# Patient Record
Sex: FEMALE | Race: BLACK OR AFRICAN AMERICAN | NOT HISPANIC OR LATINO | Employment: PART TIME | URBAN - METROPOLITAN AREA
[De-identification: names, ages, dates, MRNs, and addresses within clinical notes are randomized per-mention and may not be internally consistent; named-entity substitution may affect disease eponyms.]

---

## 2017-03-12 ENCOUNTER — HOSPITAL ENCOUNTER (EMERGENCY)
Facility: HOSPITAL | Age: 22
Discharge: HOME | End: 2017-03-12
Attending: EMERGENCY MEDICINE

## 2017-03-12 VITALS
HEART RATE: 98 BPM | TEMPERATURE: 98.6 F | DIASTOLIC BLOOD PRESSURE: 63 MMHG | WEIGHT: 205.03 LBS | SYSTOLIC BLOOD PRESSURE: 108 MMHG | OXYGEN SATURATION: 99 % | RESPIRATION RATE: 16 BRPM

## 2017-03-12 DIAGNOSIS — J02.9 PHARYNGITIS: Primary | ICD-10-CM

## 2017-03-12 DIAGNOSIS — R31.29 MICROSCOPIC HEMATURIA: ICD-10-CM

## 2017-03-12 DIAGNOSIS — M79.10 MYALGIA: ICD-10-CM

## 2017-03-12 LAB
BACTERIA UR QL AUTO: ABNORMAL /HPF
BILIRUB UR QL STRIP: NEGATIVE
CLARITY UR: CLEAR
COLOR UR: YELLOW
COLOR, POC: YELLOW
GLUCOSE UR STRIP-MCNC: NEGATIVE MG/DL
HCG UR QL: NEGATIVE
HGB UR QL STRIP.AUTO: ABNORMAL
KETONES UR STRIP-MCNC: NEGATIVE MG/DL
LEUKOCYTE ESTERASE UR QL STRIP: NEGATIVE
NITRITE UR QL STRIP: NEGATIVE
NON-SQ EPI CELLS URNS QL MICRO: ABNORMAL /HPF
PH UR STRIP.AUTO: 5.5 [PH] (ref 4.5–8)
PROT UR STRIP-MCNC: NEGATIVE MG/DL
RBC #/AREA URNS AUTO: ABNORMAL /HPF
SP GR UR STRIP.AUTO: <=1.005 (ref 1–1.03)
UROBILINOGEN UR QL STRIP.AUTO: 0.2 E.U./DL
WBC #/AREA URNS AUTO: ABNORMAL /HPF

## 2017-03-12 PROCEDURE — 81002 URINALYSIS NONAUTO W/O SCOPE: CPT | Performed by: EMERGENCY MEDICINE

## 2017-03-12 PROCEDURE — 81001 URINALYSIS AUTO W/SCOPE: CPT

## 2017-03-12 PROCEDURE — 87086 URINE CULTURE/COLONY COUNT: CPT

## 2017-03-12 PROCEDURE — 81025 URINE PREGNANCY TEST: CPT | Performed by: EMERGENCY MEDICINE

## 2017-03-12 PROCEDURE — 87389 HIV-1 AG W/HIV-1&-2 AB AG IA: CPT | Performed by: EMERGENCY MEDICINE

## 2017-03-12 PROCEDURE — 36415 COLL VENOUS BLD VENIPUNCTURE: CPT | Performed by: EMERGENCY MEDICINE

## 2017-03-12 PROCEDURE — 87591 N.GONORRHOEAE DNA AMP PROB: CPT | Performed by: EMERGENCY MEDICINE

## 2017-03-12 PROCEDURE — 87491 CHLMYD TRACH DNA AMP PROBE: CPT | Performed by: EMERGENCY MEDICINE

## 2017-03-12 PROCEDURE — 99283 EMERGENCY DEPT VISIT LOW MDM: CPT

## 2017-03-12 RX ORDER — LIDOCAINE 50 MG/G
1 PATCH TOPICAL ONCE
Status: DISCONTINUED | OUTPATIENT
Start: 2017-03-12 | End: 2017-03-12 | Stop reason: HOSPADM

## 2017-03-12 RX ORDER — ACETAMINOPHEN 325 MG/1
650 TABLET ORAL ONCE
Status: COMPLETED | OUTPATIENT
Start: 2017-03-12 | End: 2017-03-12

## 2017-03-12 RX ORDER — AMOXICILLIN 500 MG/1
500 CAPSULE ORAL 2 TIMES DAILY
Qty: 20 CAPSULE | Refills: 0 | Status: SHIPPED | OUTPATIENT
Start: 2017-03-12 | End: 2017-03-22

## 2017-03-12 RX ADMIN — ACETAMINOPHEN 650 MG: 325 TABLET, FILM COATED ORAL at 14:50

## 2017-03-12 RX ADMIN — DEXAMETHASONE SODIUM PHOSPHATE 10 MG: 10 INJECTION INTRAMUSCULAR; INTRAVENOUS at 14:51

## 2017-03-12 RX ADMIN — LIDOCAINE 1 PATCH: 50 PATCH CUTANEOUS at 15:33

## 2017-03-13 LAB
BACTERIA UR CULT: NORMAL
CHLAMYDIA DNA CVX QL NAA+PROBE: NORMAL
HIV 1+2 AB+HIV1 P24 AG SERPL QL IA: NORMAL
N GONORRHOEA DNA GENITAL QL NAA+PROBE: NORMAL

## 2024-07-11 ENCOUNTER — TELEPHONE (OUTPATIENT)
Age: 29
End: 2024-07-11

## 2024-07-11 NOTE — TELEPHONE ENCOUNTER
New patient called, EARLENE delivered  in [place] & now moved to . Pt requests postpartum visit & bp check. Clarified pt has an appt with [facility] ; pt states [facility] does not accept [place] [employer] & requests scheduling with [facility]. Pt states [place] [employer] insurance lapsed but  advised it will reinstate  or 7/15.   Warm transferred to [facility] office for assistance with scheduling.

## 2024-08-13 ENCOUNTER — OFFICE VISIT (OUTPATIENT)
Dept: OBGYN CLINIC | Facility: CLINIC | Age: 29
End: 2024-08-13
Payer: COMMERCIAL

## 2024-08-13 VITALS
HEIGHT: 60 IN | SYSTOLIC BLOOD PRESSURE: 138 MMHG | WEIGHT: 164 LBS | BODY MASS INDEX: 32.2 KG/M2 | DIASTOLIC BLOOD PRESSURE: 84 MMHG

## 2024-08-13 DIAGNOSIS — R73.09 ELEVATED HEMOGLOBIN A1C: ICD-10-CM

## 2024-08-13 DIAGNOSIS — Z97.5 NEXPLANON IN PLACE: ICD-10-CM

## 2024-08-13 DIAGNOSIS — O41.1230 CHORIOAMNIONITIS IN THIRD TRIMESTER, SINGLE OR UNSPECIFIED FETUS: ICD-10-CM

## 2024-08-13 DIAGNOSIS — O13.3 GESTATIONAL HYPERTENSION, THIRD TRIMESTER: ICD-10-CM

## 2024-08-13 DIAGNOSIS — Z65.9 OTHER SOCIAL STRESSOR: ICD-10-CM

## 2024-08-13 DIAGNOSIS — D50.8 IRON DEFICIENCY ANEMIA SECONDARY TO INADEQUATE DIETARY IRON INTAKE: ICD-10-CM

## 2024-08-13 DIAGNOSIS — O99.340 DEPRESSION DURING PREGNANCY, ANTEPARTUM: ICD-10-CM

## 2024-08-13 DIAGNOSIS — F32.A DEPRESSION DURING PREGNANCY, ANTEPARTUM: ICD-10-CM

## 2024-08-13 DIAGNOSIS — O03.4 RETAINED PRODUCTS OF CONCEPTION FOLLOWING ABORTION: ICD-10-CM

## 2024-08-13 PROBLEM — O41.1290 CHORIOAMNIONITIS: Status: ACTIVE | Noted: 2024-07-06

## 2024-08-13 PROBLEM — O13.9 GESTATIONAL HYPERTENSION: Status: ACTIVE | Noted: 2024-07-06

## 2024-08-13 NOTE — PROGRESS NOTES
Subjective    Patient is a 30 yo  who presents today for routine postpartum care s/p  at [facility] on 24. She was induced in the setting of [facility]. Her labor course was complicated by chorioamnionitis and gestational hypertension. She did not sustain any lacerations during delivery. She received a Nexplanon prior to discharge for contraception. She reports light bleeding that re-started recently, but otherwise feels ok. She does note that she has been more emotional lately and crying a lot, which she attributes to psychosocial stressors in her life. She reports that FOB is not involved, but she moved to the area recently to live with her mom, who is supportive.    OB History          3    Para   1    Term   1            AB   2    Living   1         SAB        IAB   2    Ectopic        Multiple        Live Births   1                        Objective    Vitals:    24 1109   BP: 138/84   BP Location: Left arm   Patient Position: Sitting   Cuff Size: Standard   Weight: 74.4 kg (164 lb)   Height: 5' (1.524 m)        Physical Exam  Constitutional:       Appearance: Normal appearance.   HENT:      Head: Normocephalic and atraumatic.   Cardiovascular:      Rate and Rhythm: Normal rate.   Pulmonary:      Effort: Pulmonary effort is normal. No respiratory distress.   Abdominal:      Palpations: Abdomen is soft.      Tenderness: There is no abdominal tenderness.   Musculoskeletal:         General: Normal range of motion.   Neurological:      Mental Status: She is alert.   Skin:     General: Skin is warm and dry.          Assessment    Patient Active Problem List   Diagnosis    Chorioamnionitis    Depression during pregnancy, antepartum    Elevated hemoglobin A1c    Gestational hypertension    Iron deficiency anemia secondary to inadequate dietary iron intake    Nexplanon in place    Other social stressor    Retained products of conception following         Plan  - Discussed re-starting Zoloft  due to her depressive symptoms; patient declines at this time, but will call if she is interested  - Patient reports continued carpal tunnel symptoms in her left wrist, which she had during pregnancy; discussed seeing PCP  - Return for annual exam

## 2024-10-07 ENCOUNTER — TELEPHONE (OUTPATIENT)
Dept: FAMILY MEDICINE CLINIC | Facility: CLINIC | Age: 29
End: 2024-10-07

## 2024-10-07 NOTE — TELEPHONE ENCOUNTER
Pt had 10/7 appt and was canceled due to provider not in office/ left detailed message and to call us back with rescheduling. If pt calls back pr comes to office, please assist with rescheduling.

## 2024-10-24 ENCOUNTER — HOSPITAL ENCOUNTER (OUTPATIENT)
Dept: RADIOLOGY | Facility: HOSPITAL | Age: 29
End: 2024-10-24
Payer: COMMERCIAL

## 2024-10-24 ENCOUNTER — APPOINTMENT (OUTPATIENT)
Dept: LAB | Facility: CLINIC | Age: 29
End: 2024-10-24
Payer: COMMERCIAL

## 2024-10-24 ENCOUNTER — OFFICE VISIT (OUTPATIENT)
Dept: FAMILY MEDICINE CLINIC | Facility: CLINIC | Age: 29
End: 2024-10-24

## 2024-10-24 VITALS
TEMPERATURE: 97.6 F | SYSTOLIC BLOOD PRESSURE: 116 MMHG | HEIGHT: 60 IN | DIASTOLIC BLOOD PRESSURE: 62 MMHG | BODY MASS INDEX: 33.38 KG/M2 | RESPIRATION RATE: 17 BRPM | HEART RATE: 88 BPM | OXYGEN SATURATION: 98 % | WEIGHT: 170 LBS

## 2024-10-24 DIAGNOSIS — Z11.59 NEED FOR HEPATITIS C SCREENING TEST: ICD-10-CM

## 2024-10-24 DIAGNOSIS — M25.532 WRIST PAIN, CHRONIC, LEFT: ICD-10-CM

## 2024-10-24 DIAGNOSIS — Z78.9 EXCLUSIVE BREASTFEEDING BY MOTHER: ICD-10-CM

## 2024-10-24 DIAGNOSIS — I49.3 FREQUENT PVCS: ICD-10-CM

## 2024-10-24 DIAGNOSIS — D50.8 IRON DEFICIENCY ANEMIA SECONDARY TO INADEQUATE DIETARY IRON INTAKE: ICD-10-CM

## 2024-10-24 DIAGNOSIS — R73.09 ELEVATED HEMOGLOBIN A1C: ICD-10-CM

## 2024-10-24 DIAGNOSIS — G89.29 WRIST PAIN, CHRONIC, LEFT: ICD-10-CM

## 2024-10-24 DIAGNOSIS — Z00.00 ENCOUNTER FOR MEDICAL EXAMINATION TO ESTABLISH CARE: ICD-10-CM

## 2024-10-24 DIAGNOSIS — R73.09 ELEVATED HEMOGLOBIN A1C: Primary | ICD-10-CM

## 2024-10-24 DIAGNOSIS — I49.9 IRREGULAR HEART RHYTHM: ICD-10-CM

## 2024-10-24 PROBLEM — O03.4 RETAINED PRODUCTS OF CONCEPTION FOLLOWING ABORTION: Status: RESOLVED | Noted: 2018-11-01 | Resolved: 2024-10-24

## 2024-10-24 PROBLEM — Z65.9 OTHER SOCIAL STRESSOR: Status: RESOLVED | Noted: 2024-01-03 | Resolved: 2024-10-24

## 2024-10-24 PROBLEM — O13.9 GESTATIONAL HYPERTENSION: Status: RESOLVED | Noted: 2024-07-06 | Resolved: 2024-10-24

## 2024-10-24 PROBLEM — O41.1290 CHORIOAMNIONITIS: Status: RESOLVED | Noted: 2024-07-06 | Resolved: 2024-10-24

## 2024-10-24 LAB
BASOPHILS # BLD AUTO: 0.04 THOUSANDS/ΜL (ref 0–0.1)
BASOPHILS NFR BLD AUTO: 1 % (ref 0–1)
EOSINOPHIL # BLD AUTO: 0.07 THOUSAND/ΜL (ref 0–0.61)
EOSINOPHIL NFR BLD AUTO: 1 % (ref 0–6)
ERYTHROCYTE [DISTWIDTH] IN BLOOD BY AUTOMATED COUNT: 12 % (ref 11.6–15.1)
EST. AVERAGE GLUCOSE BLD GHB EST-MCNC: 111 MG/DL
FERRITIN SERPL-MCNC: 54 NG/ML (ref 11–307)
HBA1C MFR BLD: 5.5 %
HCT VFR BLD AUTO: 48 % (ref 34.8–46.1)
HCV AB SER QL: NORMAL
HGB BLD-MCNC: 14.9 G/DL (ref 11.5–15.4)
IMM GRANULOCYTES # BLD AUTO: 0.02 THOUSAND/UL (ref 0–0.2)
IMM GRANULOCYTES NFR BLD AUTO: 0 % (ref 0–2)
IRON SATN MFR SERPL: 24 % (ref 15–50)
IRON SERPL-MCNC: 88 UG/DL (ref 50–212)
LYMPHOCYTES # BLD AUTO: 2.64 THOUSANDS/ΜL (ref 0.6–4.47)
LYMPHOCYTES NFR BLD AUTO: 41 % (ref 14–44)
MCH RBC QN AUTO: 30.6 PG (ref 26.8–34.3)
MCHC RBC AUTO-ENTMCNC: 31 G/DL (ref 31.4–37.4)
MCV RBC AUTO: 99 FL (ref 82–98)
MONOCYTES # BLD AUTO: 0.51 THOUSAND/ΜL (ref 0.17–1.22)
MONOCYTES NFR BLD AUTO: 8 % (ref 4–12)
NEUTROPHILS # BLD AUTO: 3.12 THOUSANDS/ΜL (ref 1.85–7.62)
NEUTS SEG NFR BLD AUTO: 49 % (ref 43–75)
NRBC BLD AUTO-RTO: 0 /100 WBCS
PLATELET # BLD AUTO: 271 THOUSANDS/UL (ref 149–390)
PMV BLD AUTO: 9.4 FL (ref 8.9–12.7)
RBC # BLD AUTO: 4.87 MILLION/UL (ref 3.81–5.12)
TIBC SERPL-MCNC: 371 UG/DL (ref 250–450)
UIBC SERPL-MCNC: 283 UG/DL (ref 155–355)
WBC # BLD AUTO: 6.4 THOUSAND/UL (ref 4.31–10.16)

## 2024-10-24 PROCEDURE — 82728 ASSAY OF FERRITIN: CPT

## 2024-10-24 PROCEDURE — 36415 COLL VENOUS BLD VENIPUNCTURE: CPT

## 2024-10-24 PROCEDURE — 83550 IRON BINDING TEST: CPT

## 2024-10-24 PROCEDURE — 86803 HEPATITIS C AB TEST: CPT

## 2024-10-24 PROCEDURE — 83036 HEMOGLOBIN GLYCOSYLATED A1C: CPT

## 2024-10-24 PROCEDURE — 99204 OFFICE O/P NEW MOD 45 MIN: CPT | Performed by: FAMILY MEDICINE

## 2024-10-24 PROCEDURE — 73110 X-RAY EXAM OF WRIST: CPT

## 2024-10-24 PROCEDURE — 83540 ASSAY OF IRON: CPT

## 2024-10-24 PROCEDURE — 85025 COMPLETE CBC W/AUTO DIFF WBC: CPT

## 2024-10-24 RX ORDER — DOCUSATE SODIUM 100 MG/1
100 CAPSULE, LIQUID FILLED ORAL 2 TIMES DAILY
COMMUNITY

## 2024-10-24 RX ORDER — PRENATAL WITH FERROUS FUM AND FOLIC ACID 3080; 920; 120; 400; 22; 1.84; 3; 20; 10; 1; 12; 200; 27; 25; 2 [IU]/1; [IU]/1; MG/1; [IU]/1; MG/1; MG/1; MG/1; MG/1; MG/1; MG/1; UG/1; MG/1; MG/1; MG/1; MG/1
1 TABLET ORAL DAILY
COMMUNITY
Start: 2024-08-16
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
## 2024-11-04 PROCEDURE — 93000 ELECTROCARDIOGRAM COMPLETE: CPT | Performed by: FAMILY MEDICINE

## 2024-11-15 ENCOUNTER — APPOINTMENT (OUTPATIENT)
Dept: LAB | Facility: CLINIC | Age: 29
End: 2024-11-15
Payer: COMMERCIAL

## 2024-11-15 ENCOUNTER — OFFICE VISIT (OUTPATIENT)
Dept: FAMILY MEDICINE CLINIC | Facility: CLINIC | Age: 29
End: 2024-11-15

## 2024-11-15 VITALS
HEART RATE: 63 BPM | RESPIRATION RATE: 18 BRPM | SYSTOLIC BLOOD PRESSURE: 110 MMHG | DIASTOLIC BLOOD PRESSURE: 62 MMHG | OXYGEN SATURATION: 99 % | HEIGHT: 60 IN | TEMPERATURE: 97.7 F | BODY MASS INDEX: 34.63 KG/M2 | WEIGHT: 176.4 LBS

## 2024-11-15 DIAGNOSIS — G89.29 WRIST PAIN, CHRONIC, LEFT: Primary | ICD-10-CM

## 2024-11-15 DIAGNOSIS — L85.3 XEROSIS CUTIS: ICD-10-CM

## 2024-11-15 DIAGNOSIS — Z23 ENCOUNTER FOR IMMUNIZATION: ICD-10-CM

## 2024-11-15 DIAGNOSIS — Z78.9 BREASTFEEDING (INFANT): ICD-10-CM

## 2024-11-15 DIAGNOSIS — I49.9 IRREGULAR HEART RHYTHM: ICD-10-CM

## 2024-11-15 DIAGNOSIS — I49.3 FREQUENT PVCS: ICD-10-CM

## 2024-11-15 DIAGNOSIS — H93.8X1 MASS OF RIGHT EAR: ICD-10-CM

## 2024-11-15 DIAGNOSIS — M25.532 WRIST PAIN, CHRONIC, LEFT: Primary | ICD-10-CM

## 2024-11-15 LAB
ALBUMIN SERPL BCG-MCNC: 4.1 G/DL (ref 3.5–5)
ALP SERPL-CCNC: 77 U/L (ref 34–104)
ALT SERPL W P-5'-P-CCNC: 17 U/L (ref 7–52)
ANION GAP SERPL CALCULATED.3IONS-SCNC: 2 MMOL/L (ref 4–13)
AST SERPL W P-5'-P-CCNC: 17 U/L (ref 13–39)
BILIRUB SERPL-MCNC: 0.33 MG/DL (ref 0.2–1)
BUN SERPL-MCNC: 14 MG/DL (ref 5–25)
CALCIUM SERPL-MCNC: 9.5 MG/DL (ref 8.4–10.2)
CHLORIDE SERPL-SCNC: 103 MMOL/L (ref 96–108)
CO2 SERPL-SCNC: 32 MMOL/L (ref 21–32)
CREAT SERPL-MCNC: 0.89 MG/DL (ref 0.6–1.3)
GFR SERPL CREATININE-BSD FRML MDRD: 89 ML/MIN/1.73SQ M
GLUCOSE P FAST SERPL-MCNC: 84 MG/DL (ref 65–99)
POTASSIUM SERPL-SCNC: 4.7 MMOL/L (ref 3.5–5.3)
PROT SERPL-MCNC: 7.4 G/DL (ref 6.4–8.4)
SODIUM SERPL-SCNC: 137 MMOL/L (ref 135–147)
T4 FREE SERPL-MCNC: 0.99 NG/DL (ref 0.61–1.12)
TSH SERPL DL<=0.05 MIU/L-ACNC: 1.19 UIU/ML (ref 0.45–4.5)

## 2024-11-15 PROCEDURE — 80053 COMPREHEN METABOLIC PANEL: CPT

## 2024-11-15 PROCEDURE — 84439 ASSAY OF FREE THYROXINE: CPT

## 2024-11-15 PROCEDURE — 90471 IMMUNIZATION ADMIN: CPT | Performed by: FAMILY MEDICINE

## 2024-11-15 PROCEDURE — 99214 OFFICE O/P EST MOD 30 MIN: CPT | Performed by: FAMILY MEDICINE

## 2024-11-15 PROCEDURE — 36415 COLL VENOUS BLD VENIPUNCTURE: CPT

## 2024-11-15 PROCEDURE — 84443 ASSAY THYROID STIM HORMONE: CPT

## 2024-11-15 PROCEDURE — 90656 IIV3 VACC NO PRSV 0.5 ML IM: CPT | Performed by: FAMILY MEDICINE

## 2024-11-15 RX ORDER — AMMONIUM LACTATE 12 G/100G
CREAM TOPICAL AS NEEDED
Qty: 385 G | Refills: 0 | Status: SHIPPED | OUTPATIENT
Start: 2024-11-15

## 2024-11-15 NOTE — PROGRESS NOTES
Name: Kenna Vera      : 1995      MRN: [id number]  Encounter Provider: Shandra Carnes MD  Encounter Date: 11/15/2024   Encounter department: [facility]  :  Assessment & Plan  Wrist pain, chronic, left  Left wrist x-ray showed no abnormalities no abnormalities  Okay ice,  compression, stretches  Wrist brace ordered  Will refer to orthopedics  Orders:    Ambulatory Referral to Orthopedic Surgery; Future    Elastic Bandages & Supports (Adjustable Wrist Brace) MISC; Use daily as needed (left wrist pain)    Mass of right ear  cystic mobile mass of right lower lobe   Will order ultrasound  Orders:    US head neck soft tissue; Future    Xerosis cutis  Apply to feet twice daily as needed  Orders:    ammonium lactate (LAC-HYDRIN) 12 % cream; Apply topically as needed for dry skin    Breastfeeding (infant)    Orders:    Breast pump    Encounter for immunization    Orders:    influenza vaccine preservative-free 0.5 mL IM (Fluzone, Afluria, Fluarix, Flulaval)           History of Present Illness     HPI  Kenna Vera is a 29 y.o. female  who presented to the office today to follow-up for chronic conditions.    Pt states that the left wrist pain has not improved.  The pain is a 9/10 mostly while sleeping at night, and then increases when she tries to move it.  Has a 4 month old baby whom she is still breastfeeding,    Pt is right handed.  No numbness or tingling in the left hand.  The left hand feels like it is about to lock when she stretches it.     Patient is exclusively breast-feeding her 4-month-old daughter, requesting breast pump      The following portions of the patient's history were reviewed and updated as appropriate: allergies, current medications, past family history, past medical history, past social history, past surgical history and problem list.    Review of Systems   Constitutional:  Negative for chills and fever.   HENT:  Negative for congestion,  rhinorrhea and sore throat.    Respiratory:  Negative for cough and shortness of breath.    Cardiovascular:  Positive for palpitations. Negative for chest pain.   Gastrointestinal:  Positive for constipation. Negative for diarrhea, nausea and vomiting.   Genitourinary:  Negative for vaginal discharge.   Skin:  Negative for rash.   Neurological:  Negative for dizziness and headaches.          Objective   /62 (BP Location: Right arm, Patient Position: Sitting, Cuff Size: Standard)   Pulse 63   Temp 97.7 °F (36.5 °C) (Temporal)   Resp 18   Ht 5' (1.524 m)   Wt 80 kg (176 lb 6.4 oz)   SpO2 99%   BMI 34.45 kg/m²      Physical Exam  Vitals and nursing note reviewed.   Constitutional:       Appearance: Normal appearance.   HENT:      Head: Normocephalic and atraumatic.      Mouth/Throat:      Mouth: Mucous membranes are moist.   Cardiovascular:      Rate and Rhythm: Normal rate.   Pulmonary:      Effort: Pulmonary effort is normal.   Musculoskeletal:      Left wrist: Swelling, tenderness and bony tenderness present. Decreased range of motion. Normal pulse.   Neurological:      Mental Status: She is alert and oriented to person, place, and time.   Psychiatric:         Mood and Affect: Mood normal.         Behavior: Behavior normal.

## 2024-11-20 ENCOUNTER — OFFICE VISIT (OUTPATIENT)
Dept: OBGYN CLINIC | Facility: CLINIC | Age: 29
End: 2024-11-20
Payer: COMMERCIAL

## 2024-11-20 VITALS — BODY MASS INDEX: 34.55 KG/M2 | WEIGHT: 176 LBS | HEIGHT: 60 IN

## 2024-11-20 DIAGNOSIS — M25.532 PAIN IN LEFT WRIST: ICD-10-CM

## 2024-11-20 DIAGNOSIS — M65.4 TENDINITIS, DE QUERVAIN'S: Primary | ICD-10-CM

## 2024-11-20 PROCEDURE — 99203 OFFICE O/P NEW LOW 30 MIN: CPT | Performed by: SURGERY

## 2024-11-20 NOTE — PROGRESS NOTES
ORTHOPAEDIC HAND, WRIST, AND ELBOW OFFICE  VISIT       ASSESSMENT/PLAN:      29 y.o. year old female who presents with left DeQuervain's Tendonitis    Physical exam was performed and the findings are consistent with DeQuervain's Tendonitis  Injections were offered for tendonits and we discussed they are 75-80% effective at resolving symptoms. They may have a repeat injection in 3 months if they get good relief of symptoms or full resolution that returns. We also discuss she may start night time splinting  Pt would like to start with splinting. Thumb spica dispensed for night time use.   NSAID's as needed for pain  Activities as tolerated and increase as able.  Work note provided  She may return for possible injection if she fails to get relief with splinting        The patient verbalized understanding of exam findings and treatment plan. We engaged in the shared decision-making process and treatment options were discussed at length with the patient. Surgical and conservative management discussed today along with risks and benefits.    Diagnoses and all orders for this visit:    Tendinitis, de Quervain's  -     Durable Medical Equipment    Pain in left wrist  -     Ambulatory Referral to Orthopedic Surgery        Follow Up:  Return in about 4 weeks (around 12/18/2024) for Recheck.    To Do Next Visit:  Re-evaluation of current issue Can have injection      ____________________________________________________________________________________________________________________________________________      CHIEF COMPLAINT:  Chief Complaint   Patient presents with    Left Wrist - Pain     Poss cyst and cts        SUBJECTIVE:  Kenna Vera is a 29 y.o. year old  female who presents for evaluation of Left wrist    Patient reports she ha had left radial wrist pain for several months. Pain started about after her daughter was born this past summer. She denies any injuries.  She did have numbness and tingling when she was  pregnant but that has since resolved. Pain is note with holding her child.  She has been using Voltaren gel and a brace that was ordered 5 days ago by her PCP. She states neither have provided any relief    I have personally reviewed all the relevant PMH, PSH, SH, FH, Medications and allergies      PAST MEDICAL HISTORY:  Past Medical History:   Diagnosis Date    Anemia     Anxiety     Chronic pain of left wrist     Gestational hypertension 07/06/2024    Received 5 day Lasix course at [facility]  Treated for 1 severe range BP during labor, not discharged on any medications         PAST SURGICAL HISTORY:  Past Surgical History:   Procedure Laterality Date    NO PAST SURGERIES         FAMILY HISTORY:  Family History   Problem Relation Age of Onset    Diabetes Father     Asthma Brother        SOCIAL HISTORY:  Social History     Tobacco Use    Smoking status: Former   Vaping Use    Vaping status: Never Used   Substance Use Topics    Alcohol use: Never    Drug use: Never       MEDICATIONS:    Current Outpatient Medications:     ammonium lactate (LAC-HYDRIN) 12 % cream, Apply topically as needed for dry skin, Disp: 385 g, Rfl: 0    Diclofenac Sodium (VOLTAREN) 1 %, Apply 2 g topically 4 (four) times a day, Disp: 150 g, Rfl: 0    docusate sodium (COLACE) 100 mg capsule, Take 100 mg by mouth 2 (two) times a day, Disp: , Rfl:     Elastic Bandages & Supports (Adjustable Wrist Brace) MISC, Use daily as needed (left wrist pain), Disp: 1 each, Rfl: 0    Prenatal 27-1 MG TABS, Take 1 tablet by mouth daily, Disp: , Rfl:     ALLERGIES:  No Known Allergies        REVIEW OF SYSTEMS:  Review of Systems   Constitutional:  Negative for chills and fever.   HENT:  Negative for ear pain and sore throat.    Eyes:  Negative for pain and visual disturbance.   Respiratory:  Negative for cough and shortness of breath.    Cardiovascular:  Negative for chest pain and palpitations.   Gastrointestinal:  Negative for abdominal pain and vomiting.    Genitourinary:  Negative for dysuria and hematuria.   Musculoskeletal:  Negative for arthralgias and back pain.   Skin:  Negative for color change and rash.   Neurological:  Negative for seizures and syncope.   All other systems reviewed and are negative.      VITALS:  There were no vitals filed for this visit.    LABS:  HgA1c:   Lab Results   Component Value Date    HGBA1C 5.5 10/24/2024     BMP:   Lab Results   Component Value Date    CALCIUM 9.5 11/15/2024    K 4.7 11/15/2024    CO2 32 11/15/2024     11/15/2024    BUN 14 11/15/2024    CREATININE 0.89 11/15/2024       _____________________________________________________  PHYSICAL EXAMINATION:  General: well developed and well nourished, alert, oriented times 3, and appears comfortable  Psychiatric: Normal  HEENT: Normocephalic, Atraumatic Trachea Midline, No torticollis  Pulmonary: No audible wheezing or respiratory distress   Abdomen/GI: Non tender, non distended   Cardiovascular: No pitting edema, 2+ radial pulse   Skin: No masses, erythema, lacerations, fluctation, ulcerations  Neurovascular: Sensation Intact to the Median, Ulnar, Radial Nerve, Motor Intact to the Median, Ulnar, Radial Nerve, and Pulses Intact  Musculoskeletal: Normal, except as noted in detailed exam and in HPI.      MUSCULOSKELETAL EXAMINATION:  Right hand:  SILT  Composite fist    Left hand:  SILT  Composite fist    De Quervain's Tenosynovitis Exam:  left side  Positive tender to palpation over 1st dorsal extensor compartment   Positive Finkelstein's test        ___________________________________________________  STUDIES REVIEWED:  No new images obtained/reviewed        PROCEDURES PERFORMED:  Procedures  No Procedures performed today    _____________________________________________________      Scribe Attestation      I,:  Sudhir Pineda am acting as a scribe while in the presence of the attending physician.:       I,:  Aaron Galvin MD personally performed the services described in  this documentation    as scribed in my presence.:

## 2024-11-20 NOTE — LETTER
November 20, 2024     Patient: Kenna Vera  YOB: 1995  Date of Visit: 11/20/2024      To Whom it May Concern:    Kenna Vera is under my professional care. Kenna was seen in my office on 11/20/2024. Kenna was seen for her left wrist tendinitis. She will be out of work until her follow up visit with updates provided at that time    If you have any questions or concerns, please don't hesitate to call.         Sincerely,          Aaron Galvin MD

## 2024-12-02 ENCOUNTER — RESULTS FOLLOW-UP (OUTPATIENT)
Dept: FAMILY MEDICINE CLINIC | Facility: CLINIC | Age: 29
End: 2024-12-02

## 2024-12-02 ENCOUNTER — HOSPITAL ENCOUNTER (OUTPATIENT)
Dept: ULTRASOUND IMAGING | Facility: HOSPITAL | Age: 29
Discharge: HOME | End: 2024-12-02
Attending: FAMILY MEDICINE
Payer: COMMERCIAL

## 2024-12-02 DIAGNOSIS — H93.8X1 MASS OF RIGHT EAR: ICD-10-CM

## 2024-12-02 PROCEDURE — 76536 US EXAM OF HEAD AND NECK: CPT

## 2024-12-18 ENCOUNTER — OFFICE VISIT (OUTPATIENT)
Dept: OBGYN CLINIC | Facility: CLINIC | Age: 29
End: 2024-12-18
Payer: COMMERCIAL

## 2024-12-18 VITALS — HEIGHT: 60 IN | BODY MASS INDEX: 34.55 KG/M2 | WEIGHT: 176 LBS

## 2024-12-18 DIAGNOSIS — M65.4 TENDINITIS, DE QUERVAIN'S: Primary | ICD-10-CM

## 2024-12-18 PROCEDURE — 20550 NJX 1 TENDON SHEATH/LIGAMENT: CPT | Performed by: PHYSICIAN ASSISTANT

## 2024-12-18 RX ORDER — LIDOCAINE HYDROCHLORIDE 10 MG/ML
3 INJECTION, SOLUTION INFILTRATION; PERINEURAL
Status: COMPLETED | OUTPATIENT
Start: 2024-12-18 | End: 2024-12-18

## 2024-12-18 RX ORDER — BETAMETHASONE SODIUM PHOSPHATE AND BETAMETHASONE ACETATE 3; 3 MG/ML; MG/ML
6 INJECTION, SUSPENSION INTRA-ARTICULAR; INTRALESIONAL; INTRAMUSCULAR; SOFT TISSUE
Status: COMPLETED | OUTPATIENT
Start: 2024-12-18 | End: 2024-12-18

## 2024-12-18 RX ADMIN — BETAMETHASONE SODIUM PHOSPHATE AND BETAMETHASONE ACETATE 6 MG: 3; 3 INJECTION, SUSPENSION INTRA-ARTICULAR; INTRALESIONAL; INTRAMUSCULAR; SOFT TISSUE at 11:00

## 2024-12-18 RX ADMIN — LIDOCAINE HYDROCHLORIDE 3 ML: 10 INJECTION, SOLUTION INFILTRATION; PERINEURAL at 11:00

## 2024-12-18 NOTE — PROGRESS NOTES
HPI:  29F here for follow-up.  Today she states her left radial wrist is still painful, 8/10.  She has been wearing the brace and noticed some improvement but not much.  Symptoms are made worse when she picks up her daughter.  No numbness tingling into the fingers.  She is open to steroid injection if offered today.      PE:  Left wrist: No open wounds or erythema.  Swelling of the first dorsal compartment is present.  Tender to palpation first dorsal extensor compartment.  Normal thumb flexion extension without locking.  Full composite fist, normal wrist range of motion.  There is discomfort with wrist extension.  5/5  strength.  Sensation intact to light touch median radial ulnar nerve distribution.  Palpable radial pulse.      A/P:  Left DeQuervain's tenosynovitis  -Steroid injection provided today and the patient tolerated well.  -Post-injection ice is recommended.  -Can stop the bracing.  -Activities as tolerated.  -May follow-up PRN, call us in the future is symptoms persist or return.  -All questions answered.    Hand/upper extremity injection: L extensor compartment 1  Universal Protocol:  Consent: Verbal consent obtained.  Risks and benefits: risks, benefits and alternatives were discussed  Consent given by: patient  Patient identity confirmed: verbally with patient  Supporting Documentation  Indications: pain and tendon swelling   Procedure Details  Condition:de Quervain's tenosynovitis Site: L extensor compartment 1   Preparation: Patient was prepped and draped in the usual sterile fashion  Needle size: 22 G  Ultrasound guidance: no  Approach: radial  Medications administered: 3 mL lidocaine 1 %; 6 mg betamethasone acetate-betamethasone sodium phosphate 6 (3-3) mg/mL  Patient tolerance: patient tolerated the procedure well with no immediate complications  Dressing:  Sterile dressing applied

## 2024-12-19 ENCOUNTER — RESULTS FOLLOW-UP (OUTPATIENT)
Dept: FAMILY MEDICINE CLINIC | Facility: CLINIC | Age: 29
End: 2024-12-19

## 2025-01-23 ENCOUNTER — TELEPHONE (OUTPATIENT)
Dept: FAMILY MEDICINE CLINIC | Facility: CLINIC | Age: 30
End: 2025-01-23

## 2025-01-30 ENCOUNTER — OFFICE VISIT (OUTPATIENT)
Dept: FAMILY MEDICINE CLINIC | Facility: CLINIC | Age: 30
End: 2025-01-30

## 2025-01-30 VITALS
SYSTOLIC BLOOD PRESSURE: 112 MMHG | WEIGHT: 191 LBS | OXYGEN SATURATION: 96 % | HEART RATE: 78 BPM | TEMPERATURE: 97.9 F | RESPIRATION RATE: 16 BRPM | HEIGHT: 60 IN | DIASTOLIC BLOOD PRESSURE: 78 MMHG | BODY MASS INDEX: 37.5 KG/M2

## 2025-01-30 DIAGNOSIS — H93.8X1 MASS OF RIGHT EAR: Primary | ICD-10-CM

## 2025-01-30 DIAGNOSIS — L72.0 EPIDERMAL INCLUSION CYST: ICD-10-CM

## 2025-01-30 PROCEDURE — 99213 OFFICE O/P EST LOW 20 MIN: CPT | Performed by: FAMILY MEDICINE

## 2025-01-30 NOTE — PROGRESS NOTES
Name: Kenna Vera      : 1995      MRN: [id number]  Encounter Provider: Shandra Carnes MD  Encounter Date: 2025   Encounter department: [facility]  :  Assessment & Plan  Mass of right ear  Ultrasound 2024: Small, solitary subcutaneous nodule, complicated cyst  Refer to Plastics for further management.  Orders:  •  Ambulatory Referral to Plastic Surgery; Future    Epidermal inclusion cyst    Orders:  •  Ambulatory Referral to Plastic Surgery; Future           History of Present Illness   HPI  Kenna Vera is a 29 y.o. female  has a past medical history of Anemia, Anxiety, Chronic pain of left wrist, and Gestational hypertension. who presented to the office today to follow up for the right ear lobe mass.  Since the ultrasound on 2025, the mass has increased in size.  The cyst is painful.  It is not draining any fluid and it is not red.    Review of Systems   Constitutional:  Negative for chills and fever.   HENT:  Negative for congestion, rhinorrhea and sore throat.    Respiratory:  Negative for cough and shortness of breath.    Cardiovascular:  Negative for chest pain and palpitations.   Gastrointestinal:  Positive for constipation. Negative for diarrhea, nausea and vomiting.   Genitourinary:  Negative for vaginal discharge.   Skin:  Negative for rash.   Neurological:  Negative for dizziness and headaches.       Objective   /78 (BP Location: Left arm, Patient Position: Sitting, Cuff Size: Large)   Pulse 78   Temp 97.9 °F (36.6 °C) (Temporal)   Resp 16   Ht 5' (1.524 m)   Wt 86.6 kg (191 lb)   LMP  (LMP Unknown)   SpO2 96%   BMI 37.30 kg/m²      Physical Exam  Vitals and nursing note reviewed.   Constitutional:       Appearance: Normal appearance.   HENT:      Head: Normocephalic and atraumatic.      Comments: Right ear lobe 1 cm oval firm mass, skin intact, +tenderness, no erythema     Mouth/Throat:      Mouth: Mucous membranes are  moist.   Cardiovascular:      Rate and Rhythm: Normal rate.   Pulmonary:      Effort: Pulmonary effort is normal.   Neurological:      Mental Status: She is alert and oriented to person, place, and time.   Psychiatric:         Mood and Affect: Mood normal.         Behavior: Behavior normal.

## 2025-01-31 ENCOUNTER — TELEPHONE (OUTPATIENT)
Dept: ADMINISTRATIVE | Facility: OTHER | Age: 30
End: 2025-01-31

## 2025-01-31 NOTE — TELEPHONE ENCOUNTER
Upon review of the In Basket request we were able to locate, review, and update the patient chart as requested for Pap Smear (HPV) aka Cervical Cancer Screening.    Any additional questions or concerns should be emailed to the Practice Liaisons via the appropriate education email address, please do not reply via In Basket.    Thank you  Yarelis Perez MA   [facility]           [phone]

## 2025-01-31 NOTE — TELEPHONE ENCOUNTER
----- Message from Shandra Carnes MD sent at 1/30/2025 11:11 AM EST -----  Regarding: Care Gap Request  01/30/25 11:11 AM    Hello, our patient Kenna Vera has had Pap Smear (HPV) aka Cervical Cancer Screening completed/performed. Please assist in updating the patient chart by making an External outreach to [facility] located in [place]. The date of service is 2023.    Thank you,  Shandra Carnes MD   ALONDRA MEZA

## 2025-02-03 NOTE — TELEPHONE ENCOUNTER
Discussed management at the office visit, than you.   - Dr. Carnes
Patient had xrays done of her ear and would like a call back regarding the results. She stated her cyst  n ear is very painful.  
POST-OP DIAGNOSIS:  Thickened endometrium 07-Dec-2023 10:17:28  Vanessa Maddox  Endometrial polyp 07-Dec-2023 10:17:36  Vanessa Maddox

## 2025-03-06 NOTE — PROGRESS NOTES
Cardiology Consultation     Kenna Vera  [id number]  1995  [facility] CARDIOLOGY [place]  [address] [address]      1. Frequent PVCs  Ambulatory Referral to Cardiology    POCT ECG    Holter monitor    Echo complete w/ contrast if indicated      2. Iron deficiency anemia secondary to inadequate dietary iron intake        3. Irregular heart rhythm  Ambulatory Referral to Cardiology    POCT ECG    Holter monitor    Echo complete w/ contrast if indicated          Discussion/Summary:  Frequent PVCs  -Patient reports having some intermittent palpitations  - ECG 10/24/2024 showed sinus rhythm with frequent PVCs, borderline LA enlargement  - ECG again today showed sinus rhythm with frequent PVCs  -Will check a 48-hour Holter monitor to evaluate for PVC burden  - Also check a transthoracic echo to evaluate for structural abnormalities as a cause of her frequent PVCs as well as PVC induced cardiomyopathy  Iron deficiency anemia  -Reports receiving iron infusions in the past  - Currently improved  Depression    Follow-up in 4 months or so.    History of Present Illness:  Kenna Vera is a 29 y.o. year old female with a past medical history of iron deficiency anemia and depression.  Reports feeling okay today.  She has intermittent episodes of palpitations.  She has an 8-month-old and reports having palpitations before she became pregnant.  While at her PCPs, she was feeling palpitations and an EKG done which showed sinus rhythm with PVCs.  Due to this, she was referred to cardiology for evaluation.  Reports her episodes are very intermittent.  When she feels palpitations, reports they generally last the entire day, but does have sometimes 30 to 60 minutes of no palpitations during that day.  Reports is feeling like skipped beats.  No clear exacerbating factors.  Does report some improvement if she drinks more water when she is having the palpitations.  Former smoker, quit when she became  pregnant.  Denies any smoking anymore cigarettes, but does smoke marijuana and intermittently now.  Also reports some intermittent episodes of feet swelling.  Also reports having history of anxiety.  Denies any prior cardiac history or family history of heart disease.      Patient Active Problem List   Diagnosis    Depression during pregnancy, antepartum    Elevated hemoglobin A1c    Iron deficiency anemia secondary to inadequate dietary iron intake    Nexplanon in place    Postpartum depression    Breastfeeding (infant)    Tendinitis, de Quervain's     Past Medical History:   Diagnosis Date    Anemia     Anxiety     Chronic pain of left wrist     Gestational hypertension 07/06/2024    Received 5 day Lasix course at [facility]  Treated for 1 severe range BP during labor, not discharged on any medications       Social History     Socioeconomic History    Marital status: Single     Spouse name: Not on file    Number of children: Not on file    Years of education: Not on file    Highest education level: Not on file   Occupational History    Not on file   Tobacco Use    Smoking status: Former    Smokeless tobacco: Not on file   Vaping Use    Vaping status: Never Used   Substance and Sexual Activity    Alcohol use: Never    Drug use: Never    Sexual activity: Not Currently     Partners: Male     Birth control/protection: Implant, Other     Comment: The nexplanon   Other Topics Concern    Not on file   Social History Narrative    Resides with her Mother and daughter    Occupation: work from home    Caffeine: none    Diet: Regular    Exercise: Occasional, has started walking     Social Drivers of Health     Financial Resource Strain: Medium Risk (1/7/2025)    Overall Financial Resource Strain (CARDIA)     Difficulty of Paying Living Expenses: Somewhat hard   Food Insecurity: Food Insecurity Present (1/7/2025)    Hunger Vital Sign     Worried About Running Out of Food in the Last Year: Often true     Ran Out of Food in the Last  Year: Sometimes true   Transportation Needs: Unmet Transportation Needs (1/7/2025)    PRAPARE - Transportation     Lack of Transportation (Medical): Yes     Lack of Transportation (Non-Medical): Yes   Physical Activity: Insufficiently Active (5/6/2024)    Received from [facility]    Exercise Vital Sign     Days of Exercise per Week: 1 day     Minutes of Exercise per Session: 30 min   Stress: No Stress Concern Present (5/6/2024)    Received from [facility]    [employer] - Occupational Stress Questionnaire     Feeling of Stress : Not at all   Social Connections: Not on file   Intimate Partner Violence: Not on file   Housing Stability: High Risk (1/7/2025)    Housing Stability Vital Sign     Unable to Pay for Housing in the Last Year: Yes     Number of Times Moved in the Last Year: Not on file     Homeless in the Last Year: Yes      Family History   Problem Relation Age of Onset    Diabetes Father     Asthma Brother      Past Surgical History:   Procedure Laterality Date    NO PAST SURGERIES         Current Outpatient Medications:     ammonium lactate (LAC-HYDRIN) 12 % cream, Apply topically as needed for dry skin, Disp: 385 g, Rfl: 0    Diclofenac Sodium (VOLTAREN) 1 %, Apply 2 g topically 4 (four) times a day, Disp: 150 g, Rfl: 0    docusate sodium (COLACE) 100 mg capsule, Take 100 mg by mouth 2 (two) times a day, Disp: , Rfl:     Elastic Bandages & Supports (Adjustable Wrist Brace) MISC, Use daily as needed (left wrist pain), Disp: 1 each, Rfl: 0    Debo-greer 8.6 MG tablet, take 1 tablet by mouth every 12 hours if needed for constipation, Disp: , Rfl:     Prenatal 27-1 MG TABS, Take 1 tablet by mouth daily, Disp: , Rfl:   No Known Allergies      Labs:  Lab Results   Component Value Date    ALT 17 11/15/2024    AST 17 11/15/2024    BUN 14 11/15/2024    CALCIUM 9.5 11/15/2024     11/15/2024    CO2 32 11/15/2024     CREATININE 0.89 11/15/2024    HCT 48.0 (H) 10/24/2024    HGB 14.9 10/24/2024    HGBA1C 5.5 10/24/2024     10/24/2024    K 4.7 11/15/2024    WBC 6.40 10/24/2024       Imaging: No results found.    ECG: 3/7/2025: Sinus rhythm with frequent PVCs, otherwise normal ECG    Review of Systems:  Review of Systems   Constitutional:  Negative for chills, diaphoresis, fatigue and fever.   HENT:  Negative for congestion.    Eyes:  Negative for photophobia and visual disturbance.   Respiratory:  Negative for chest tightness and shortness of breath.    Cardiovascular:  Positive for palpitations and leg swelling. Negative for chest pain.   Gastrointestinal:  Negative for abdominal distention, abdominal pain, diarrhea, nausea and vomiting.   Genitourinary:  Negative for difficulty urinating and dysuria.   Musculoskeletal:  Negative for arthralgias, gait problem and joint swelling.   Skin:  Negative for color change, pallor and rash.   Neurological:  Negative for dizziness, syncope, numbness and headaches.   Psychiatric/Behavioral:  Negative for agitation, behavioral problems and confusion.          Vitals:    03/07/25 1059   BP: 100/68   Pulse: 77      Vitals:    03/07/25 1059   Weight: 88 kg (194 lb)           Physical Exam:  General appearance:  Appears stated age, alert, well appearing and in no distress  HEENT:  PERRLA, EOMI, no scleral icterus, no conjunctival pallor  NECK:  Supple, No elevated JVP, no thyromegaly, no carotid bruits  HEART:  Regular rate and rhythm, occasional ectopic beats, normal S1/S2, no S3/S4, no murmur or rub  LUNGS:  Clear to auscultation bilaterally  ABDOMEN:  Soft, non-tender, positive bowel sounds, no rebound or guarding, no organomegaly   EXTREMITIES:  No edema  VASCULAR:  Normal pedal pulses   SKIN: No lesions or rashes on exposed skin  NEURO:  CN II-XII intact, no focal deficits

## 2025-03-07 ENCOUNTER — OFFICE VISIT (OUTPATIENT)
Dept: CARDIOLOGY CLINIC | Facility: CLINIC | Age: 30
End: 2025-03-07
Payer: COMMERCIAL

## 2025-03-07 VITALS
BODY MASS INDEX: 37.89 KG/M2 | SYSTOLIC BLOOD PRESSURE: 100 MMHG | HEART RATE: 77 BPM | DIASTOLIC BLOOD PRESSURE: 68 MMHG | WEIGHT: 194 LBS

## 2025-03-07 DIAGNOSIS — D50.8 IRON DEFICIENCY ANEMIA SECONDARY TO INADEQUATE DIETARY IRON INTAKE: ICD-10-CM

## 2025-03-07 DIAGNOSIS — I49.3 FREQUENT PVCS: Primary | ICD-10-CM

## 2025-03-07 DIAGNOSIS — I49.9 IRREGULAR HEART RHYTHM: ICD-10-CM

## 2025-03-07 PROCEDURE — 99244 OFF/OP CNSLTJ NEW/EST MOD 40: CPT | Performed by: STUDENT IN AN ORGANIZED HEALTH CARE EDUCATION/TRAINING PROGRAM

## 2025-03-07 PROCEDURE — 93000 ELECTROCARDIOGRAM COMPLETE: CPT | Performed by: STUDENT IN AN ORGANIZED HEALTH CARE EDUCATION/TRAINING PROGRAM

## 2025-03-07 RX ORDER — SENNOSIDES 8.6 MG/1
TABLET ORAL
COMMUNITY
Start: 2025-02-18

## 2025-03-07 NOTE — PATIENT INSTRUCTIONS
"  Patient Education     Ventricular premature beats   The Basics   Written by the doctors and editors at [facility]   What are ventricular premature beats? -- Ventricular premature beats (\"VPBs\") are extra beats that happen before a normal heartbeat. The heart is divided into 4 sections (figure 1), called \"chambers.\" VPBs start in 1 of the 2 lower chambers, called the \"ventricles.\"  VPBs are caused by a problem with the heart's electrical system, in which the ventricles send abnormal electrical signals. These signals cause the extra beats.  VPBs are common. They can happen in healthy people. They can also happen in people who have different types of heart disease. VPBs are very common after a heart attack.  What are the symptoms of VPBs? -- Most people with VPBs do not have any symptoms. A doctor or nurse might find VPBs when they listen to your heart during an exam. Or doctors might find VPBs if they do a heart test called an electrocardiogram (\"ECG\").  If you do have symptoms, they can include:   Feeling like the heart is beating hard, beating fast, or skipping a beat - These heartbeat changes are called \"palpitations.\"   Feeling dizzy or lightheaded   Pounding feeling in the neck  You might be more likely to notice VPBs when you lie quietly in bed at night or lie on your left side.  Should I see a doctor or nurse? -- If your heart often seems to beat fast or hard, or skip beats, talk to your doctor or nurse. VPBs are common, but you might have another condition that is causing the symptoms. VPBs can also be a sign of a more serious heart condition.  Is there a test for VPBs? -- Yes. The doctor or nurse will do an exam and learn about your symptoms. They can also do the following tests:   ECG - This test measures the electrical activity in your heart (figure 2).   Holter monitor - This is a small, portable machine you wear that records all of your heart's electrical activity over 1 or 2 days (figure 3).  If you have " "VPBs often or the doctor thinks that you could have a specific heart condition, they can order other tests.  How are VPBs treated? -- Treatment depends on what is causing the VPBs and whether they cause symptoms. If you do not have symptoms, you might not need any treatment. If the VPBs are caused by another heart condition, doctors will treat that condition.  Possible treatments include:   Medicines to control the speed or rhythm of your heartbeat   \"Ablation\" - During this procedure, the doctor uses heat (called \"radiofrequency ablation\") or cold (called \"cryoablation\") to destroy the small part of the heart that is sending the abnormal electrical signals. You might have this treatment if medicines do not control your VPBs.  All topics are updated as new evidence becomes available and our peer review process is complete.  This topic retrieved from [employer] on: Feb 26, 2024.  Topic 36596 Version 14.0  Release: 32.2.4 - C32.56  © 2024 UpToDate, Inc. and/or its affiliates. All rights reserved.  figure 1: Normal heart     This is a drawing of a normal heart. The heart has 4 chambers: right atrium, left atrium, right ventricle, and left ventricle. Blood flows from the right atrium to the right ventricle through the tricuspid valve. Blood flows from the left atrium to the left ventricle through the mitral valve.  Graphic 47339 Version 4.0  figure 2: Person having an ECG     This drawing shows a person having an ECG (also called an electrocardiogram or EKG). There are patches, called \"electrodes,\" stuck onto the chest, arms, and legs. Wires run from the electrodes to the ECG machine. An ECG measures the electrical activity in the heart.  Graphic 56843 Version 3.0  figure 3: Holter monitor     People with possible heart problems are sometimes asked to wear a device called a Holter monitor for 1 or 2 days. The device measures the electrical activity in the heart. It helps doctors pinpoint heart rhythm problems. You will " "have \"electrodes\" stuck to your chest that are connected to wires leading to the monitor. These electrodes tell the monitor how often your heart beats and if it has a normal rhythm. While you have a Holter monitor on, you should do your normal activities but keep the electrodes, wires, and device dry. Some people have an abnormal heart rhythm only during certain activities or certain times of the day.  Graphic 89839 Version 8.0  Consumer Information Use and Disclaimer   Disclaimer: This generalized information is a limited summary of diagnosis, treatment, and/or medication information. It is not meant to be comprehensive and should be used as a tool to help the user understand and/or assess potential diagnostic and treatment options. It does NOT include all information about conditions, treatments, medications, side effects, or risks that may apply to a specific patient. It is not intended to be medical advice or a substitute for the medical advice, diagnosis, or treatment of a health care provider based on the health care provider's examination and assessment of a patient's specific and unique circumstances. Patients must speak with a health care provider for complete information about their health, medical questions, and treatment options, including any risks or benefits regarding use of medications. This information does not endorse any treatments or medications as safe, effective, or approved for treating a specific patient. UpToDate, Inc. and its affiliates disclaim any warranty or liability relating to this information or the use thereof.The use of this information is governed by the Terms of Use, available at https://www.woltersAthletePathuwer.com/en/know/clinical-effectiveness-terms. 2024© UpToDate, Inc. and its affiliates and/or licensors. All rights reserved.  Copyright   © 2024 UpToDate, Inc. and/or its affiliates. All rights reserved.    "

## 2025-03-31 ENCOUNTER — HOSPITAL ENCOUNTER (OUTPATIENT)
Dept: NON INVASIVE DIAGNOSTICS | Facility: HOSPITAL | Age: 30
Discharge: HOME | End: 2025-03-31
Attending: STUDENT IN AN ORGANIZED HEALTH CARE EDUCATION/TRAINING PROGRAM
Payer: COMMERCIAL

## 2025-03-31 VITALS
SYSTOLIC BLOOD PRESSURE: 100 MMHG | DIASTOLIC BLOOD PRESSURE: 68 MMHG | HEART RATE: 75 BPM | BODY MASS INDEX: 38.09 KG/M2 | HEIGHT: 60 IN | WEIGHT: 194 LBS

## 2025-03-31 DIAGNOSIS — I49.3 FREQUENT PVCS: ICD-10-CM

## 2025-03-31 DIAGNOSIS — I49.9 IRREGULAR HEART RHYTHM: ICD-10-CM

## 2025-03-31 LAB
AORTIC ROOT: 2.9 CM
AORTIC VALVE MEAN VELOCITY: 9.8 M/S
ASCENDING AORTA: 2.8 CM
AV AREA BY CONTINUOUS VTI: 2.2 CM2
AV AREA PEAK VELOCITY: 2.2 CM2
AV LVOT MEAN GRADIENT: 3 MMHG
AV LVOT PEAK GRADIENT: 5 MMHG
AV MEAN PRESS GRAD SYS DOP V1V2: 5 MMHG
AV ORIFICE AREA US: 2.19 CM2
AV PEAK GRADIENT: 9 MMHG
AV VELOCITY RATIO: 0.77
AV VMAX SYS DOP: 1.49 M/S
BSA FOR ECHO PROCEDURE: 1.84 M2
DOP CALC AO VTI: 29.19 CM
DOP CALC LVOT AREA: 2.83 CM2
DOP CALC LVOT CARDIAC INDEX: 2.86 L/MIN/M2
DOP CALC LVOT CARDIAC OUTPUT: 5.26 L/MIN
DOP CALC LVOT DIAMETER: 1.9 CM
DOP CALC LVOT PEAK VEL VTI: 22.59 CM
DOP CALC LVOT PEAK VEL: 1.13 M/S
DOP CALC LVOT STROKE INDEX: 33.2 ML/M2
DOP CALC LVOT STROKE VOLUME: 64.02
E WAVE DECELERATION TIME: 168 MS
E/A RATIO: 1.43
FRACTIONAL SHORTENING: 33 (ref 28–44)
INTERVENTRICULAR SEPTUM IN DIASTOLE (PARASTERNAL SHORT AXIS VIEW): 1 CM
INTERVENTRICULAR SEPTUM: 1 CM (ref 0.6–1.1)
LAAS-AP2: 18.3 CM2
LAAS-AP4: 18 CM2
LEFT ATRIUM SIZE: 3.6 CM
LEFT ATRIUM VOLUME (MOD BIPLANE): 52 ML
LEFT ATRIUM VOLUME INDEX (MOD BIPLANE): 28.3 ML/M2
LEFT INTERNAL DIMENSION IN SYSTOLE: 3.2 CM (ref 2.1–4)
LEFT VENTRICULAR INTERNAL DIMENSION IN DIASTOLE: 4.8 CM (ref 3.5–6)
LEFT VENTRICULAR POSTERIOR WALL IN END DIASTOLE: 1 CM
LEFT VENTRICULAR STROKE VOLUME: 66 ML
LV EF US.2D.A4C+ESTIMATED: 52 %
LVSV (TEICH): 66 ML
MV E'TISSUE VEL-LAT: 17 CM/S
MV E'TISSUE VEL-SEP: 14 CM/S
MV PEAK A VEL: 0.58 M/S
MV PEAK E VEL: 83 CM/S
MV STENOSIS PRESSURE HALF TIME: 49 MS
MV VALVE AREA P 1/2 METHOD: 4.49
RIGHT ATRIUM AREA SYSTOLE A4C: 15.9 CM2
RIGHT VENTRICLE ID DIMENSION: 3.9 CM
SINOTUBULAR JUNCTION: 2.2 CM
SL CV LEFT ATRIUM LENGTH A2C: 4.9 CM
SL CV LV EF: 55
SL CV PED ECHO LEFT VENTRICLE DIASTOLIC VOLUME (MOD BIPLANE) 2D: 106 ML
SL CV PED ECHO LEFT VENTRICLE SYSTOLIC VOLUME (MOD BIPLANE) 2D: 40 ML
SL CV SINUS OF VALSALVA 2D: 2.9 CM
STJ: 2.2 CM
TRICUSPID ANNULAR PLANE SYSTOLIC EXCURSION: 2.1 CM

## 2025-03-31 PROCEDURE — 93306 TTE W/DOPPLER COMPLETE: CPT | Performed by: STUDENT IN AN ORGANIZED HEALTH CARE EDUCATION/TRAINING PROGRAM

## 2025-03-31 PROCEDURE — 93306 TTE W/DOPPLER COMPLETE: CPT

## 2025-03-31 PROCEDURE — 93226 XTRNL ECG REC<48 HR SCAN A/R: CPT

## 2025-03-31 PROCEDURE — 93225 XTRNL ECG REC<48 HRS REC: CPT

## 2025-04-08 PROCEDURE — 93227 XTRNL ECG REC<48 HR R&I: CPT | Performed by: STUDENT IN AN ORGANIZED HEALTH CARE EDUCATION/TRAINING PROGRAM

## 2025-04-30 ENCOUNTER — OFFICE VISIT (OUTPATIENT)
Dept: FAMILY MEDICINE CLINIC | Facility: CLINIC | Age: 30
End: 2025-04-30

## 2025-04-30 VITALS
HEART RATE: 81 BPM | RESPIRATION RATE: 18 BRPM | OXYGEN SATURATION: 97 % | TEMPERATURE: 97.8 F | WEIGHT: 196 LBS | HEIGHT: 60 IN | DIASTOLIC BLOOD PRESSURE: 72 MMHG | BODY MASS INDEX: 38.48 KG/M2 | SYSTOLIC BLOOD PRESSURE: 114 MMHG

## 2025-04-30 DIAGNOSIS — D50.8 IRON DEFICIENCY ANEMIA SECONDARY TO INADEQUATE DIETARY IRON INTAKE: ICD-10-CM

## 2025-04-30 DIAGNOSIS — Z78.9 BREASTFEEDING (INFANT): ICD-10-CM

## 2025-04-30 DIAGNOSIS — Z01.00 ENCOUNTER FOR VISION SCREENING: ICD-10-CM

## 2025-04-30 DIAGNOSIS — Z00.00 ANNUAL PHYSICAL EXAM: Primary | ICD-10-CM

## 2025-04-30 DIAGNOSIS — Z63.8: ICD-10-CM

## 2025-04-30 DIAGNOSIS — K59.00 CONSTIPATION, UNSPECIFIED CONSTIPATION TYPE: ICD-10-CM

## 2025-04-30 DIAGNOSIS — E66.9 OBESITY (BMI 30-39.9): ICD-10-CM

## 2025-04-30 DIAGNOSIS — H65.02 ACUTE SEROUS OTITIS MEDIA OF LEFT EAR, RECURRENCE NOT SPECIFIED: ICD-10-CM

## 2025-04-30 DIAGNOSIS — J30.9 ALLERGIC RHINITIS, UNSPECIFIED SEASONALITY, UNSPECIFIED TRIGGER: ICD-10-CM

## 2025-04-30 PROCEDURE — 99214 OFFICE O/P EST MOD 30 MIN: CPT | Performed by: FAMILY MEDICINE

## 2025-04-30 PROCEDURE — 99395 PREV VISIT EST AGE 18-39: CPT | Performed by: FAMILY MEDICINE

## 2025-04-30 RX ORDER — PRENATAL WITH FERROUS FUM AND FOLIC ACID 3080; 920; 120; 400; 22; 1.84; 3; 20; 10; 1; 12; 200; 27; 25; 2 [IU]/1; [IU]/1; MG/1; [IU]/1; MG/1; MG/1; MG/1; MG/1; MG/1; MG/1; UG/1; MG/1; MG/1; MG/1; MG/1
1 TABLET ORAL DAILY
Qty: 90 TABLET | Refills: 0 | Status: SHIPPED | OUTPATIENT
Start: 2025-04-30

## 2025-04-30 RX ORDER — FLUTICASONE PROPIONATE 50 MCG
1 SPRAY, SUSPENSION (ML) NASAL DAILY
Qty: 16 G | Refills: 0 | Status: SHIPPED | OUTPATIENT
Start: 2025-04-30

## 2025-04-30 RX ORDER — SENNOSIDES 8.6 MG/1
1 TABLET ORAL 2 TIMES DAILY
Qty: 60 TABLET | Refills: 2 | Status: SHIPPED | OUTPATIENT
Start: 2025-04-30

## 2025-04-30 RX ORDER — CETIRIZINE HYDROCHLORIDE 10 MG/1
10 TABLET ORAL DAILY
Qty: 30 TABLET | Refills: 2 | Status: SHIPPED | OUTPATIENT
Start: 2025-04-30

## 2025-04-30 SDOH — SOCIAL STABILITY - SOCIAL INSECURITY: OTHER SPECIFIED PROBLEMS RELATED TO PRIMARY SUPPORT GROUP: Z63.8

## 2025-04-30 NOTE — PATIENT INSTRUCTIONS
Outpatient Mental Health for Children    If you would like to be placed on the wait list for services with [facility] you MUST contact intake at [facility] - [phone]    Emergency & Crisis Support    Suicide and Crisis Lifeline: Call or text 988 (Available 24 hours)  Crisis Text Line: Text HOME to 409604 (Available 24/7)  Warm Line: Call [phone] (Confidential mental health support, available Monday-Sunday: 6 AM-10 AM & 4 PM-12 AM)  [facility]: Call [phone] (For mental health emergencies, or visit your local Emergency Department)  Teen Help Line: Call 596-044-FSKN (National helpline for mental health support)  Crime Victims [place]: Call [phone] (24/7 [facility] Hotline, counseling, court & hospital accompaniment, free services)    [facility]    [facility]  [phone]  [email or web address]  Support for mental health conditions. Free services for all    [facility]  [address], [place] [address]  [phone]  Services for ages 12+; English only; Accepts commercial insurance only    [facility]  [address], [place] [address]  [phone]  Services for ages 3+. Bilingual (English/Sing[place]an); Accepts Medical Assistance    [facility]  [address], [place] [address]  [phone]  Services for all ages; Bilingual (English/Sing[place]an); Accepts [employer] BCBS, [employer], Aetna, Optum and Cigna    [facility])  [address] [place] [address]  [phone] (Option 2)  Services for ages 5+. Bilingual (English/Singaporean); Accepts Medical Assistance    [facility]  [address] [place] [address]  (Option 0)  Services for ages 14+. Bilingual (English/Sing[place]an); Accepts Medical Assistance, Medicare, and commercial insurance    [facility]  [address] [place] [address] [phone] (Option  "5)  Services for ages 3+. Bilingual (English/[place]an); Accepts Medical Assistance and commercial insurance    [facility]   [address], [place], [place]   [phone]  Services for all ages; English only; Accepts [employer] BCBS, [employer] BCBS and Medicare    [facility]  [address] [place] [address]  [phone]  Services for ages 5+. Bilingual (English/[place]); Accepts Medical Assistance     [facility]  [address] [place] [address]  [phone]  Services for ages 3+. Bilingual (English/[place]); Accepts Medical Assistance and some commercial insurance    [facility]  [address] [place] [address]  [phone]  Services for ages 5+. Bilingual (English/[place]); Accepts Medical Assistance    [facility]  [address], [place] [address]  [phone]  Services for all ages (Therapy); 18+ (Psychiatry); Accepts [employer] Blue Cross, Aetna, Highmark, [employer], [employer] (CHIP & commercial insurance)    www.[email or web address] is a resource to find psychotherapy providers. Patients can filter results by language, specialty, gender, insurance, and more. Contact multiple providers from the directory, as many have waitlists and it's important to find the right fit for you.      Please contact your insurance provider for additional information.   Patient Education     Routine physical for adults   The Basics   Written by the doctors and editors at [facility]   What is a physical? -- A physical is a routine visit, or \"check-up,\" with your doctor. You might also hear it called a \"wellness visit\" or \"preventive visit.\"  During each visit, the doctor will:   Ask about your physical and mental health   Ask about your habits, behaviors, and lifestyle   Do an exam   Give you vaccines if needed   Talk to you about any medicines you take   Give advice about your health   Answer your questions  Getting regular check-ups is an " "important part of taking care of your health. It can help your doctor find and treat any problems you have. But it's also important for preventing health problems.  A routine physical is different from a \"sick visit.\" A sick visit is when you see a doctor because of a health concern or problem. Since physicals are scheduled ahead of time, you can think about what you want to ask the doctor.  How often should I get a physical? -- It depends on your age and health. In general, for people age 21 years and older:   If you are younger than 50 years, you might be able to get a physical every 3 years.   If you are 50 years or older, your doctor might recommend a physical every year.  If you have an ongoing health condition, like diabetes or high blood pressure, your doctor will probably want to see you more often.  What happens during a physical? -- In general, each visit will include:   Physical exam - The doctor or nurse will check your height, weight, heart rate, and blood pressure. They will also look at your eyes and ears. They will ask about how you are feeling and whether you have any symptoms that bother you.   Medicines - It's a good idea to bring a list of all the medicines you take to each doctor visit. Your doctor will talk to you about your medicines and answer any questions. Tell them if you are having any side effects that bother you. You should also tell them if you are having trouble paying for any of your medicines.   Habits and behaviors - This includes:   Your diet   Your exercise habits   Whether you smoke, drink alcohol, or use drugs   Whether you are sexually active   Whether you feel safe at home  Your doctor will talk to you about things you can do to improve your health and lower your risk of health problems. They will also offer help and support. For example, if you want to quit smoking, they can give you advice and might prescribe medicines. If you want to improve your diet or get more physical " "activity, they can help you with this, too.   Lab tests, if needed - The tests you get will depend on your age and situation. For example, your doctor might want to check your:   Cholesterol   Blood sugar   Iron level   Vaccines - The recommended vaccines will depend on your age, health, and what vaccines you already had. Vaccines are very important because they can prevent certain serious or deadly infections.   Discussion of screening - \"Screening\" means checking for diseases or other health problems before they cause symptoms. Your doctor can recommend screening based on your age, risk, and preferences. This might include tests to check for:   Cancer, such as breast, prostate, cervical, ovarian, colorectal, prostate, lung, or skin cancer   Sexually transmitted infections, such as chlamydia and gonorrhea   Mental health conditions like depression and anxiety  Your doctor will talk to you about the different types of screening tests. They can help you decide which screenings to have. They can also explain what the results might mean.   Answering questions - The physical is a good time to ask the doctor or nurse questions about your health. If needed, they can refer you to other doctors or specialists, too.  Adults older than 65 years often need other care, too. As you get older, your doctor will talk to you about:   How to prevent falling at home   Hearing or vision tests   Memory testing   How to take your medicines safely   Making sure that you have the help and support you need at home  All topics are updated as new evidence becomes available and our peer review process is complete.  This topic retrieved from [employer] on: May 02, 2024.  Topic 606583 Version 1.0  Release: 32.4.3 - C32.122  © 2024 UpToDate, Inc. and/or its affiliates. All rights reserved.  Consumer Information Use and Disclaimer   Disclaimer: This generalized information is a limited summary of diagnosis, treatment, and/or medication information. " It is not meant to be comprehensive and should be used as a tool to help the user understand and/or assess potential diagnostic and treatment options. It does NOT include all information about conditions, treatments, medications, side effects, or risks that may apply to a specific patient. It is not intended to be medical advice or a substitute for the medical advice, diagnosis, or treatment of a health care provider based on the health care provider's examination and assessment of a patient's specific and unique circumstances. Patients must speak with a health care provider for complete information about their health, medical questions, and treatment options, including any risks or benefits regarding use of medications. This information does not endorse any treatments or medications as safe, effective, or approved for treating a specific patient. UpToDate, Inc. and its affiliates disclaim any warranty or liability relating to this information or the use thereof.The use of this information is governed by the Terms of Use, available at https://www.Fluency.com/en/know/clinical-effectiveness-terms. 2024© UpToDate, Inc. and its affiliates and/or licensors. All rights reserved.  Copyright   © 2024 UpToDate, Inc. and/or its affiliates. All rights reserved.

## 2025-04-30 NOTE — ASSESSMENT & PLAN NOTE
Exclusive breastfeeding 9 month old daughter  Some milk production concerns  Orders:  •  Ambulatory Referral to Lactation; Future  •  CBC and differential; Future  •  Prenatal 27-1 MG TABS; Take 1 tablet by mouth daily

## 2025-04-30 NOTE — ASSESSMENT & PLAN NOTE
H/o postpartum depression  Stable sytmpoms  Will not restart Zoloft at this time  No SI or HI  Referred for BH counseling and to Social work for possible need for .  Orders:  •  Ambulatory Referral to Social Work Care Management Program; Future  •  Ambulatory Referral to Lactation; Future  •  Hemoglobin A1C; Future  •  Lipid panel; Future  •  Comprehensive metabolic panel; Future  •  Vitamin D 25 hydroxy; Future

## 2025-04-30 NOTE — PROGRESS NOTES
Adult Annual Physical  Name: Kenna Vera      : 1995      MRN: [id number]  Encounter Provider: Shandra Carnes MD  Encounter Date: 2025   Encounter department: [facility]    :  Assessment & Plan  Annual physical exam  Preventive screening and Immunizations reviewed    Follow up Cardiology- palpitations, Echo and Holter results  Follow up Ortho - left wrist pain  Complete labs       Encounter for vision screening    Orders:  •  Ambulatory Referral to Optometry; Future    Limited family support  Mother very supportive, Father of baby- not supportive  Orders:  •  Ambulatory Referral to Social Work Care Management Program; Future  •  Ambulatory Referral to Lactation; Future    Postpartum depression  H/o postpartum depression  Stable sytmpoms  Will not restart Zoloft at this time  No SI or HI  Referred for BH counseling and to Social work for possible need for .  Orders:  •  Ambulatory Referral to Social Work Care Management Program; Future  •  Ambulatory Referral to Lactation; Future  •  Hemoglobin A1C; Future  •  Lipid panel; Future  •  Comprehensive metabolic panel; Future  •  Vitamin D 25 hydroxy; Future    Breastfeeding (infant)  Exclusive breastfeeding 9 month old daughter  Some milk production concerns  Orders:  •  Ambulatory Referral to Lactation; Future  •  CBC and differential; Future  •  Prenatal 27-1 MG TABS; Take 1 tablet by mouth daily    Iron deficiency anemia secondary to inadequate dietary iron intake  Recheck Cbc and Iron panel  Orders:  •  Iron Panel (Includes Ferritin, Iron Sat%, Iron, and TIBC); Future    Obesity (BMI 30-39.9)  Body mass index is 38.28 kg/m².      Orders:  •  Hemoglobin A1C; Future  •  Lipid panel; Future  •  Comprehensive metabolic panel; Future  •  Vitamin D 25 hydroxy; Future    Constipation, unspecified constipation type  Stable  Continue senna  Orders:  •  Debo-greer 8.6 MG tablet; Take 1 tablet (8.6 mg total) by  mouth 2 (two) times a day    Acute serous otitis media of left ear, recurrence not specified  Avoid allergens    Orders:  •  fluticasone (FLONASE) 50 mcg/act nasal spray; 1 spray into each nostril daily  •  cetirizine (ZyrTEC) 10 mg tablet; Take 1 tablet (10 mg total) by mouth daily    Allergic rhinitis, unspecified seasonality, unspecified trigger    Orders:  •  fluticasone (FLONASE) 50 mcg/act nasal spray; 1 spray into each nostril daily        Preventive Screenings:  - Diabetes Screening: screening up-to-date  - Cholesterol Screening: risks/benefits discussed   - Hepatitis C screening: screening up-to-date   - HIV screening: screening up-to-date   - Cervical cancer screening: screening up-to-date   - Colon cancer screening: screening not indicated   - Lung cancer screening: screening not indicated     Immunizations:  - Immunizations due: HPV (Gardasil 9)    Counseling/Anticipatory Guidance:  - Alcohol: discussed moderation in alcohol intake and recommendations for healthy alcohol use.   - Drug use: discussed harms of illicit drug use and how it can negatively impact mental/physical health.   - Tobacco use: discussed harms of tobacco use and management options for quitting.   - Dental health: discussed importance of regular tooth brushing, flossing, and dental visits.   - Sexual health: discussed sexually transmitted diseases, partner selection, use of condoms, avoidance of unintended pregnancy, and contraceptive alternatives.   - Diet: discussed recommendations for a healthy/well-balanced diet.   - Exercise: the importance of regular exercise/physical activity was discussed. Recommend exercise 3-5 times per week for at least 30 minutes.   - Injury prevention: discussed safety/seat belts, safety helmets, smoke detectors, carbon monoxide detectors, and smoking near bedding or upholstery.          History of Present Illness     Adult Annual Physical:  Patient presents for annual physical. Kenna Vera is a 29 y.o.  female  who presented to the office today   with her 9 month old daughter for her Annual Physical.    + left ear discomfort and clogged feeling  Daughter is sick with a URI      The following portions of the patient's history were reviewed and updated as appropriate: allergies, current medications, past family history, past medical history, past social history, past surgical history and problem list.  .     Diet and Physical Activity:  - Diet/Nutrition: frequent junk food, consuming 3-5 servings of fruits/vegetables daily and no special diet. eating about 4 meals daily and frequent snacking  - Exercise: no formal exercise.    General Health:  - Sleep: sleeps poorly and 4-6 hours of sleep on average.  - Hearing: normal hearing bilateral ears.  - Vision: vision problems and wears glasses.  - Dental: no dental visits for > 1 year, brushes teeth twice daily and floss regularly.    /GYN Health:  - Follows with GYN: yes.   - Menopause: premenopausal.   - History of STDs: no  - Contraception:. Nexplanon      Advanced Care Planning:  - Has an advanced directive?: no    - Has a durable medical POA?: no    - ACP document given to patient?: yes      Review of Systems   Constitutional:  Negative for chills and fever.   HENT:  Negative for congestion, rhinorrhea and sore throat.    Respiratory:  Negative for cough and shortness of breath.    Cardiovascular:  Negative for chest pain and palpitations.   Gastrointestinal:  Positive for constipation. Negative for diarrhea, nausea and vomiting.   Genitourinary:  Negative for vaginal discharge.   Skin:  Negative for rash.   Neurological:  Negative for dizziness and headaches.   Psychiatric/Behavioral:  Positive for sleep disturbance. Negative for decreased concentration. The patient is not nervous/anxious.          Objective   /72 (BP Location: Left arm, Patient Position: Sitting, Cuff Size: Large)   Pulse 81   Temp 97.8 °F (36.6 °C) (Temporal)   Resp 18   Ht 5' (1.524 m)    Wt 88.9 kg (196 lb)   LMP  (LMP Unknown)   SpO2 97%   Breastfeeding Yes   BMI 38.28 kg/m²     Physical Exam  Vitals and nursing note reviewed.   Constitutional:       General: She is not in acute distress.     Appearance: Normal appearance. She is well-developed.   HENT:      Head: Normocephalic and atraumatic.      Right Ear: A middle ear effusion is present.      Left Ear: A middle ear effusion is present.      Ears:      Comments: Minimal injection of the left TM ant     Mouth/Throat:      Mouth: Mucous membranes are moist.   Eyes:      Conjunctiva/sclera: Conjunctivae normal.   Cardiovascular:      Rate and Rhythm: Normal rate and regular rhythm.      Heart sounds: No murmur heard.  Pulmonary:      Effort: Pulmonary effort is normal. No respiratory distress.      Breath sounds: Normal breath sounds.   Abdominal:      Palpations: Abdomen is soft.      Tenderness: There is no abdominal tenderness.   Musculoskeletal:         General: No swelling.      Cervical back: Neck supple.      Right lower leg: No edema.      Left lower leg: No edema.      Comments: Right ant shin:  soft 5 mm ovoid nontender mass   Skin:     General: Skin is warm and dry.      Capillary Refill: Capillary refill takes less than 2 seconds.   Neurological:      Mental Status: She is alert and oriented to person, place, and time.   Psychiatric:         Mood and Affect: Mood normal.         Behavior: Behavior normal.

## 2025-05-01 ENCOUNTER — PATIENT OUTREACH (OUTPATIENT)
Age: 30
End: 2025-05-01

## 2025-05-01 NOTE — PROGRESS NOTES
SWCM received referral from provider to assist patient with needs, OPMH Tx resources, postpartm depression and lactation support.    SWCM completed chart review. SWCM called patient to follow up and assist with needs. SWCM unable to reach patient. Left voice message requesting return call. Contact information provided.      SWCM will continue to follow up and remain available to assist as needed.

## 2025-05-06 ENCOUNTER — TELEPHONE (OUTPATIENT)
Dept: CARDIOLOGY CLINIC | Facility: CLINIC | Age: 30
End: 2025-05-06

## 2025-05-08 ENCOUNTER — PATIENT OUTREACH (OUTPATIENT)
Dept: FAMILY MEDICINE CLINIC | Facility: CLINIC | Age: 30
End: 2025-05-08

## 2025-05-08 NOTE — PROGRESS NOTES
"SWCM completed chart review. SWCM called patient to follow up and assist with needs. SWCM spoke with patient. SWCM introduced self, role, and reason for outreach. Contact information provided.    SWCM and patient discussed OPMH Tx needs. Patient denies SI/HI. Patient declined [facility] as a resource for MH needs. Patient requesting a \"regular therapist\". SWCM provided [facility] for OP MH Tx needs.     SWCM provided the following resources via email ([email or web address]) as requested by patient: [employer] Hotline: 498, [facility] Gzzkvks-[phone], and [employer] at www.postpartum.net. Patient reports no other needs currently. SWCM encouraged patient to call with questions/ needs.     SWCM will continue to follow up and remain available to assist as needed.  "

## 2025-05-15 ENCOUNTER — PATIENT OUTREACH (OUTPATIENT)
Age: 30
End: 2025-05-15

## 2025-05-15 NOTE — PROGRESS NOTES
SWCM completed chart review. SWCM called patient to follow up and assist with needs. SWCM unable to reach patient. Left voice message requesting return call. Contact information provided.     SWCM will continue to follow up and remain available to assist as needed.

## 2025-05-27 ENCOUNTER — PATIENT OUTREACH (OUTPATIENT)
Dept: FAMILY MEDICINE CLINIC | Facility: CLINIC | Age: 30
End: 2025-05-27

## 2025-05-27 NOTE — PROGRESS NOTES
SWCM completed chart review. SWCM called patient to follow up and assist with needs. SWCM unable to reach patient. Left voice message requesting return call. Contact information provided. SWCM sent UTR letter.    SWCM will continue to follow up and remain available to assist as needed.

## 2025-05-27 NOTE — LETTER
05/27/25    Dear Kenna Vera,    I am a Care Manager with [employer]  [facility]  [address]  [place] [address] [phone].  We have made several attempts to call you by phone.  It is important that you contact us back at [phone] so that we can assist with your care needs.     Sincerely,     Tabitha Davis LCSW  Social Work Care Manager

## 2025-06-03 ENCOUNTER — PATIENT OUTREACH (OUTPATIENT)
Age: 30
End: 2025-06-03

## 2025-06-03 NOTE — PROGRESS NOTES
SWCM completed chart review. SWCM called patient to follow up and assist with needs. SWCM unable to reach patient. Left voice message requesting return call. Contact information provided. SWCM previously  sent UTR letter with no response.  SWCM closed case and removed self from care team.     SWCM will remain available to assist as needed.

## 2025-06-11 ENCOUNTER — PATIENT MESSAGE (OUTPATIENT)
Dept: FAMILY MEDICINE CLINIC | Facility: CLINIC | Age: 30
End: 2025-06-11

## 2025-06-11 ENCOUNTER — TELEPHONE (OUTPATIENT)
Dept: CARDIOLOGY CLINIC | Facility: CLINIC | Age: 30
End: 2025-06-11

## 2025-06-11 ENCOUNTER — TELEPHONE (OUTPATIENT)
Dept: POSTPARTUM | Facility: CLINIC | Age: 30
End: 2025-06-11

## 2025-06-11 DIAGNOSIS — Z78.9 BREASTFEEDING (INFANT): Primary | ICD-10-CM

## 2025-06-11 NOTE — TELEPHONE ENCOUNTER
"Baby Girl is 11 mo old. Within the past 5 days she's had pain in the left breast and nipple. Two days ago she was unable to feed on that side at all. The pain does resolve after baby nurses. She feels this is similar to when her milk \"gets blocked up\" but is located more at the front of the breast. She also reports that baby has two teeth and may have cause some damage with biting.       Watch closely that your baby is always latched properly and signs that she may be starting to bite. Unlatch her and communicate gently \"no biting\". Continue to nurse on demand, but if latching her is too uncomfortable, please pump to protect your milk supply.     Cold compresses, like a bag of peas applied over a thin blanket or towel to the breast, is recommended for breast comfort and decreasing inflammation in the breast. You can also take ibuprofen as needed if safe per your PCP. Please call if these measures are not making a difference for you within the next 24 hours.           "

## 2025-06-11 NOTE — TELEPHONE ENCOUNTER
Spoke with Kenna - for the past week she has had pain/soreness in her left breast - difficult nursing on this side.     Scheduled visit 6/24 - she's requesting a call back to discuss prior to the appt.

## 2025-08-07 ENCOUNTER — TELEPHONE (OUTPATIENT)
Dept: FAMILY MEDICINE CLINIC | Facility: CLINIC | Age: 30
End: 2025-08-07

## 2025-08-18 ENCOUNTER — TELEPHONE (OUTPATIENT)
Dept: FAMILY MEDICINE CLINIC | Facility: CLINIC | Age: 30
End: 2025-08-18

## 2025-08-20 ENCOUNTER — TELEPHONE (OUTPATIENT)
Dept: FAMILY MEDICINE CLINIC | Facility: CLINIC | Age: 30
End: 2025-08-20